# Patient Record
Sex: FEMALE | Race: BLACK OR AFRICAN AMERICAN | Employment: UNEMPLOYED | URBAN - METROPOLITAN AREA
[De-identification: names, ages, dates, MRNs, and addresses within clinical notes are randomized per-mention and may not be internally consistent; named-entity substitution may affect disease eponyms.]

---
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
## 2025-06-27 NOTE — ED PROVIDER NOTES
HPI Comments: 6year-old female presenting for right ankle pain. The pain began today while patient was walking. She is unaware of any injury or trauma. She has tried Aleve which provided mild relief. Pain is worse with movement of her ankle. She denies fevers or chills. No nausea or vomiting. She denies foot pain pain or hip pain. No other complaints at this time. Pain currently7/10. Pt cannot described pain. It does not radiate    Social hx  Immunization up to date. Lives with parent      The history is provided by the patient. Past Medical History:   Diagnosis Date    Dental caries     Other ill-defined conditions(799.89)      \"BABY TEETH\" THAT HAVEN'T COME OUT       Past Surgical History:   Procedure Laterality Date    Hx adenoidectomy      Hx heent       ORAL SURGERY         Family History:   Problem Relation Age of Onset    Diabetes Maternal Grandmother     Heart Disease Maternal Grandmother      MI    Hypertension Mother     Stroke Mother     Diabetes Maternal Aunt        Social History     Social History    Marital status: SINGLE     Spouse name: N/A    Number of children: N/A    Years of education: N/A     Occupational History    Not on file. Social History Main Topics    Smoking status: Never Smoker    Smokeless tobacco: Not on file    Alcohol use No    Drug use: Not on file    Sexual activity: Not on file     Other Topics Concern    Not on file     Social History Narrative         ALLERGIES: Review of patient's allergies indicates no known allergies. Review of Systems   Constitutional: Negative for chills, fever and irritability. Gastrointestinal: Negative for abdominal pain, nausea and vomiting. Musculoskeletal: Negative for back pain, neck pain and neck stiffness. Skin: Negative for color change, rash and wound. Neurological: Negative for headaches.        Vitals:    01/05/17 1133   BP: 126/55   Pulse: 102   Resp: 16   Temp: 98.4 °F (36.9 °C)   SpO2: 98% Pt on documented O2. Patient given aerosol treatment with no adverse reactions noted. Patient instructed on proper use. No apparent respiratory distress noted. Will continue to monitor.    Weight: (!) 77 kg   Height: (!) 157.5 cm            Physical Exam   Constitutional: She appears well-nourished. She is active. No distress. HENT:   Mouth/Throat: Mucous membranes are moist.   Eyes: Conjunctivae and EOM are normal. Pupils are equal, round, and reactive to light. Neck: Normal range of motion. Neck supple. No rigidity. Cardiovascular: Normal rate and regular rhythm. Pulses are palpable. Pulmonary/Chest: Effort normal. No respiratory distress. Musculoskeletal: Normal range of motion. She exhibits tenderness. She exhibits no edema, deformity or signs of injury. Right Ankle: no edema, no ecchymosis, no redness or warmth. No obvious deformity. Pt tender to palpation lateral malleolus anterior edge. NO achilles involvement. FROM of ankle. Right Foot nontender to palpationt. No pain with palpation of 5th metatarsal.  2+ dorsalis pedis, 2+ posterior tibialis  Cap refill brisk < 3 seconds. Pt tender to palpation along tib/fib. No obvious deformity  Neurovascularly intact. Neurological: She is alert. Skin: Skin is warm and dry. Capillary refill takes less than 3 seconds. No rash noted. Nursing note and vitals reviewed. MDM  Number of Diagnoses or Management Options  Acute right ankle pain:   Diagnosis management comments: 6year-old female presenting for right ankle pain. There is no soft tissue swelling. The ankle is not red hot or swollen. She has full range of motion of the ankle without pain. She is afebrile. No signs of a septic joint. Plan: X-ray    1:33 PM  Xray is negative for acute bony process. As noted. Pt with range of motion of ankle. No fever. No redness. No signs of septic joint. Will treat conservatively with splint and have patient followup with ortho on Monday if pain persists. Patient's results have been reviewed with them.   Patient and/or family have verbally conveyed their understanding and agreement of the patient's signs, symptoms, diagnosis, treatment and prognosis and additionally agree to follow up as recommended or return to the Emergency Room should their condition change prior to follow-up. Discharge instructions have also been provided to the patient with some educational information regarding their diagnosis as well a list of reasons why they would want to return to the ER prior to their follow-up appointment should their condition change. Amount and/or Complexity of Data Reviewed  Discuss the patient with other providers: yes (ER attending-Miguel)    Patient Progress  Patient progress: stable    ED Course       Procedures             Pt case including HPI, PE, and all available lab and radiology results has been discussed with attending physician. Opportunity to evaluate patient has been provided to ER attending. Discharge and prescription plan has been agreed upon.

## 2025-07-19 ENCOUNTER — HOSPITAL ENCOUNTER (EMERGENCY)
Facility: HOSPITAL | Age: 20
Discharge: HOME | End: 2025-07-19
Attending: STUDENT IN AN ORGANIZED HEALTH CARE EDUCATION/TRAINING PROGRAM
Payer: MEDICAID

## 2025-07-19 VITALS
TEMPERATURE: 98.4 F | SYSTOLIC BLOOD PRESSURE: 117 MMHG | HEART RATE: 79 BPM | BODY MASS INDEX: 43.87 KG/M2 | RESPIRATION RATE: 18 BRPM | WEIGHT: 247.58 LBS | HEIGHT: 63 IN | OXYGEN SATURATION: 95 % | DIASTOLIC BLOOD PRESSURE: 77 MMHG
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
## 2025-07-19 PROCEDURE — 99281 EMR DPT VST MAYX REQ PHY/QHP: CPT | Performed by: STUDENT IN AN ORGANIZED HEALTH CARE EDUCATION/TRAINING PROGRAM

## 2025-07-19 PROCEDURE — 99282 EMERGENCY DEPT VISIT SF MDM: CPT

## 2025-07-19 PROCEDURE — 4500000002 HC ER NO CHARGE

## 2025-07-19 ASSESSMENT — ENCOUNTER SYMPTOMS
VOMITING: 0
ABDOMINAL PAIN: 0
NAUSEA: 0
COLOR CHANGE: 0
SHORTNESS OF BREATH: 0
SINUS PRESSURE: 0
ABDOMINAL DISTENTION: 0
SINUS PAIN: 0
EYE REDNESS: 0
EYE PAIN: 0
COUGH: 0

## 2025-07-19 ASSESSMENT — PAIN - FUNCTIONAL ASSESSMENT: PAIN_FUNCTIONAL_ASSESSMENT: NONE - DENIES PAIN
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
